# Patient Record
Sex: MALE | Race: WHITE | NOT HISPANIC OR LATINO | Employment: STUDENT | URBAN - NONMETROPOLITAN AREA
[De-identification: names, ages, dates, MRNs, and addresses within clinical notes are randomized per-mention and may not be internally consistent; named-entity substitution may affect disease eponyms.]

---
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
## 2022-12-31 ENCOUNTER — HOSPITAL ENCOUNTER (EMERGENCY)
Facility: HOSPITAL | Age: 22
Discharge: HOME | End: 2022-12-31
Attending: STUDENT IN AN ORGANIZED HEALTH CARE EDUCATION/TRAINING PROGRAM

## 2022-12-31 VITALS
TEMPERATURE: 97.4 F | DIASTOLIC BLOOD PRESSURE: 74 MMHG | OXYGEN SATURATION: 100 % | SYSTOLIC BLOOD PRESSURE: 121 MMHG | RESPIRATION RATE: 15 BRPM | WEIGHT: 140 LBS | HEART RATE: 70 BPM

## 2022-12-31 DIAGNOSIS — H66.001 NON-RECURRENT ACUTE SUPPURATIVE OTITIS MEDIA OF RIGHT EAR WITHOUT SPONTANEOUS RUPTURE OF TYMPANIC MEMBRANE: Primary | ICD-10-CM

## 2022-12-31 RX ORDER — ACETAMINOPHEN 325 MG/1
975 TABLET ORAL ONCE
Status: COMPLETED | OUTPATIENT
Start: 2022-12-31 | End: 2022-12-31

## 2022-12-31 RX ORDER — IBUPROFEN 600 MG/1
600 TABLET ORAL ONCE
Status: COMPLETED | OUTPATIENT
Start: 2022-12-31 | End: 2022-12-31

## 2022-12-31 RX ORDER — AMOXICILLIN AND CLAVULANATE POTASSIUM 875; 125 MG/1; MG/1
1 TABLET, FILM COATED ORAL EVERY 12 HOURS
Qty: 13 TABLET | Refills: 0 | Status: SHIPPED | OUTPATIENT
Start: 2022-12-31 | End: 2023-01-07

## 2022-12-31 RX ORDER — AMOXICILLIN AND CLAVULANATE POTASSIUM 875; 125 MG/1; MG/1
1 TABLET, FILM COATED ORAL ONCE
Status: COMPLETED | OUTPATIENT
Start: 2022-12-31 | End: 2022-12-31

## 2022-12-31 RX ADMIN — ACETAMINOPHEN 975 MG: 325 TABLET ORAL at 04:24

## 2022-12-31 RX ADMIN — IBUPROFEN 600 MG: 600 TABLET ORAL at 04:24

## 2022-12-31 RX ADMIN — AMOXICILLIN AND CLAVULANATE POTASSIUM 1 TABLET: 875; 125 TABLET, FILM COATED ORAL at 04:24

## 2022-12-31 NOTE — DISCHARGE INSTRUCTIONS
You are being treated with antibiotics for treatment of you are being prescribed antibiotics for treatment of an ear infection  Please take as directed  For pain, you can take Motrin 600 mg every 6 hours and Tylenol 1000 g every 6 hours  Do not hesitate to be reevaluated in the ED for any concerning signs or symptoms

## 2022-12-31 NOTE — ED PROVIDER NOTES
History  Chief Complaint   Patient presents with   • Earache     Woke up at 3am with severe R ear pain  Didn't take anything for it       History provided by:  Patient  Earache  Location:  Right  Behind ear:  No abnormality  Quality:  Sharp and aching  Severity:  Moderate  Onset quality:  Gradual  Duration:  1 hour  Timing:  Intermittent  Progression:  Waxing and waning  Chronicity:  New  Context: not direct blow, not recent URI and not water in ear    Relieved by:  None tried  Worsened by:  Nothing  Ineffective treatments:  None tried  Associated symptoms: no abdominal pain, no congestion, no cough, no diarrhea, no ear discharge, no fever, no headaches, no hearing loss, no neck pain, no rash, no rhinorrhea, no sore throat and no vomiting      History reviewed  No pertinent past medical history  History reviewed  No pertinent surgical history  History reviewed  No pertinent family history  I have reviewed and agree with the history as documented  E-Cigarette/Vaping     E-Cigarette/Vaping Substances     Review of Systems   Constitutional: Negative for activity change, appetite change, fatigue and fever  HENT: Positive for ear pain  Negative for congestion, dental problem, ear discharge, facial swelling, hearing loss, rhinorrhea, sinus pressure, sinus pain and sore throat  Eyes: Negative for photophobia, pain, discharge and redness  Respiratory: Negative for cough, chest tightness, shortness of breath and wheezing  Cardiovascular: Negative for chest pain and palpitations  Gastrointestinal: Negative for abdominal pain, diarrhea, nausea and vomiting  Musculoskeletal: Negative for arthralgias, myalgias, neck pain and neck stiffness  Skin: Negative for color change, pallor, rash and wound  Neurological: Negative for dizziness, light-headedness and headaches  Psychiatric/Behavioral: Positive for sleep disturbance  All other systems reviewed and are negative      Physical Exam  Physical Exam  Vitals and nursing note reviewed  Constitutional:       General: He is not in acute distress  Appearance: He is not ill-appearing or toxic-appearing  HENT:      Head: Normocephalic and atraumatic  Right Ear: Ear canal and external ear normal  Tenderness present  Tympanic membrane is erythematous and bulging  Left Ear: Tympanic membrane, ear canal and external ear normal       Nose: Nose normal  No congestion or rhinorrhea  Mouth/Throat:      Mouth: Mucous membranes are moist       Pharynx: Oropharynx is clear  No oropharyngeal exudate or posterior oropharyngeal erythema  Eyes:      General:         Right eye: No discharge  Left eye: No discharge  Extraocular Movements: Extraocular movements intact  Conjunctiva/sclera: Conjunctivae normal       Pupils: Pupils are equal, round, and reactive to light  Cardiovascular:      Rate and Rhythm: Normal rate and regular rhythm  Pulses: Normal pulses  Heart sounds: Normal heart sounds  No murmur heard  Pulmonary:      Effort: Pulmonary effort is normal  No respiratory distress  Breath sounds: Normal breath sounds  No stridor  No wheezing, rhonchi or rales  Chest:      Chest wall: No tenderness  Abdominal:      General: Abdomen is flat  Bowel sounds are normal  There is no distension  Palpations: Abdomen is soft  There is no mass  Tenderness: There is no abdominal tenderness  There is no right CVA tenderness, left CVA tenderness, guarding or rebound  Hernia: No hernia is present  Musculoskeletal:         General: No swelling, tenderness, deformity or signs of injury  Cervical back: Neck supple  No tenderness  Right lower leg: No edema  Left lower leg: No edema  Skin:     General: Skin is warm and dry  Capillary Refill: Capillary refill takes less than 2 seconds  Coloration: Skin is not jaundiced or pale  Findings: No bruising, erythema, lesion or rash  Neurological:      General: No focal deficit present  Mental Status: He is alert and oriented to person, place, and time  Cranial Nerves: No cranial nerve deficit  Sensory: No sensory deficit  Motor: No weakness  Coordination: Coordination normal    Psychiatric:         Mood and Affect: Mood normal          Behavior: Behavior normal          Thought Content: Thought content normal          Judgment: Judgment normal        Vital Signs  ED Triage Vitals [12/31/22 0410]   Temperature Pulse Respirations Blood Pressure SpO2   (!) 97 4 °F (36 3 °C) 70 15 121/74 100 %      Temp Source Heart Rate Source Patient Position - Orthostatic VS BP Location FiO2 (%)   Temporal Monitor -- Right arm --      Pain Score       9         Vitals:    12/31/22 0410   BP: 121/74   Pulse: 70     ED Medications  Medications   ibuprofen (MOTRIN) tablet 600 mg (600 mg Oral Given 12/31/22 0424)   acetaminophen (TYLENOL) tablet 975 mg (975 mg Oral Given 12/31/22 0424)   amoxicillin-clavulanate (AUGMENTIN) 875-125 mg per tablet 1 tablet (1 tablet Oral Given 12/31/22 0424)     Diagnostic Studies  Results Reviewed     None             No orders to display          Procedures  Procedures     ED Course     MDM     History and clinical findings are most consistent with right AOM without signs of TM perforation  Denies recent URI symptoms  A course of Augmentin was prescribed  First dose was administered in the ED  Motrin/Tylenol recommended for pain  Return precautions discussed  Stable for discharge       Disposition  Final diagnoses:   Non-recurrent acute suppurative otitis media of right ear without spontaneous rupture of tympanic membrane     Time reflects when diagnosis was documented in both MDM as applicable and the Disposition within this note     Time User Action Codes Description Comment    12/31/2022  4:22 AM Uli Rosas Add [[id number] 001] Non-recurrent acute suppurative otitis media of right ear without spontaneous rupture of tympanic membrane       ED Disposition     ED Disposition   Discharge    Condition   Stable    Date/Time   Sat Dec 31, 2022  4:22 AM    Comment   Justin Barragan discharge to home/self care  Follow-up Information    None         Patient's Medications    No medications on file       No discharge procedures on file      PDMP Review     None          ED Provider  Electronically Signed by           Russ Banda DO  12/31/22 2081

## 2023-08-29 ENCOUNTER — HOSPITAL ENCOUNTER (EMERGENCY)
Facility: HOSPITAL | Age: 23
Discharge: HOME | End: 2023-08-29
Attending: EMERGENCY MEDICINE | Admitting: EMERGENCY MEDICINE
Payer: OTHER MISCELLANEOUS

## 2023-08-29 ENCOUNTER — APPOINTMENT (EMERGENCY)
Dept: RADIOLOGY | Facility: HOSPITAL | Age: 23
End: 2023-08-29
Payer: OTHER MISCELLANEOUS

## 2023-08-29 VITALS
SYSTOLIC BLOOD PRESSURE: 118 MMHG | OXYGEN SATURATION: 100 % | WEIGHT: 136 LBS | BODY MASS INDEX: 17.45 KG/M2 | HEIGHT: 74 IN | TEMPERATURE: 97.9 F | HEART RATE: 62 BPM | DIASTOLIC BLOOD PRESSURE: 82 MMHG | RESPIRATION RATE: 16 BRPM

## 2023-08-29 DIAGNOSIS — S99.921A INJURY OF RIGHT FOOT, INITIAL ENCOUNTER: Primary | ICD-10-CM

## 2023-08-29 PROCEDURE — 73610 X-RAY EXAM OF ANKLE: CPT

## 2023-08-29 PROCEDURE — 99283 EMERGENCY DEPT VISIT LOW MDM: CPT

## 2023-08-29 PROCEDURE — 73620 X-RAY EXAM OF FOOT: CPT

## 2023-08-29 PROCEDURE — 99284 EMERGENCY DEPT VISIT MOD MDM: CPT | Performed by: EMERGENCY MEDICINE

## 2023-08-29 RX ORDER — IBUPROFEN 600 MG/1
600 TABLET ORAL ONCE
Status: COMPLETED | OUTPATIENT
Start: 2023-08-29 | End: 2023-08-29

## 2023-08-29 RX ADMIN — IBUPROFEN 600 MG: 600 TABLET ORAL at 11:34

## 2023-08-29 NOTE — ED PROVIDER NOTES
History  Chief Complaint   Patient presents with   • Foot Injury     Right foot dropped air compressor on foot while at work     61-year-old male with out pertinent past medical history who presents to the emergency department for a right foot injury after he dropped an air compressor while at work. States he did have shoes on and states that the air compressor weighed about 300 pounds. Reports pain mostly to the dorsum of his right foot. States pain when he ambulates. Denies any bleeding. No ankle injury reported. No other concerns. Did not take an thing for pain prior to arrival.          None       No past medical history on file. No past surgical history on file. No family history on file. I have reviewed and agree with the history as documented. E-Cigarette/Vaping     E-Cigarette/Vaping Substances          Review of Systems   Constitutional: Negative for activity change, appetite change, chills, diaphoresis and fever. HENT: Negative for congestion, rhinorrhea and sore throat. Eyes: Negative for visual disturbance. Respiratory: Negative for chest tightness and shortness of breath. Cardiovascular: Negative for chest pain, palpitations and leg swelling. Gastrointestinal: Negative for abdominal pain, constipation, diarrhea, nausea and vomiting. Genitourinary: Negative for difficulty urinating and hematuria. Musculoskeletal: Positive for arthralgias, gait problem and joint swelling. Negative for back pain and neck pain. Skin: Negative for color change and rash. Neurological: Negative for dizziness, weakness and headaches. Psychiatric/Behavioral: Negative for behavioral problems. Physical Exam  Physical Exam  Vitals and nursing note reviewed. Constitutional:       General: He is not in acute distress. Appearance: He is well-developed. He is not diaphoretic. HENT:      Head: Normocephalic and atraumatic.       Right Ear: External ear normal.      Left Ear: External ear normal.      Nose: Nose normal.   Eyes:      Pupils: Pupils are equal, round, and reactive to light. Cardiovascular:      Rate and Rhythm: Normal rate. Pulses: Normal pulses. Pulmonary:      Effort: Pulmonary effort is normal.   Musculoskeletal:         General: Tenderness present. No deformity. Normal range of motion. Comments: 1.5 cm area of erythema on the dorsum of the right foot; tenderness to palpation at that location; no obvious deformity notable; no tenderness over the ankle and foot otherwise; 2+ DP and PT pulses with the right lower extremity with normal sensation throughout the foot and toes   Skin:     General: Skin is warm and dry. Capillary Refill: Capillary refill takes less than 2 seconds. Findings: No erythema or rash. Neurological:      General: No focal deficit present. Mental Status: He is alert. Motor: No abnormal muscle tone. Psychiatric:         Behavior: Behavior normal.         Vital Signs  ED Triage Vitals   Temperature Pulse Respirations Blood Pressure SpO2   08/29/23 1105 08/29/23 1105 08/29/23 1105 08/29/23 1105 08/29/23 1105   97.9 °F (36.6 °C) 62 16 118/82 100 %      Temp src Heart Rate Source Patient Position - Orthostatic VS BP Location FiO2 (%)   -- 08/29/23 1105 08/29/23 1105 08/29/23 1105 --    Monitor Lying Right arm       Pain Score       08/29/23 1134       6           Vitals:    08/29/23 1105   BP: 118/82   Pulse: 62   Patient Position - Orthostatic VS: Lying         Visual Acuity      ED Medications  Medications   ibuprofen (MOTRIN) tablet 600 mg (600 mg Oral Given 8/29/23 1134)       Diagnostic Studies  Results Reviewed     None                 XR foot 2 views RIGHT   Final Result by Sam Petersen MD (08/29 1145)      Limited evaluation of the foot as noted above. No evidence for acute fracture or dislocation.             Workstation performed: [id number]         XR ankle 3+ views RIGHT   Final Result by Sam Petersen MD (08/29 1145)      Limited evaluation of the foot as noted above. No evidence for acute fracture or dislocation. Workstation performed: [id number]                    Procedures -Ace wrap applied by nursing  Procedures         ED Course          RESULTS:  Results Reviewed     None          XR foot 2 views RIGHT   Final Result      Limited evaluation of the foot as noted above. No evidence for acute fracture or dislocation. Workstation performed: [id number]         XR ankle 3+ views RIGHT   Final Result      Limited evaluation of the foot as noted above. No evidence for acute fracture or dislocation. Workstation performed: [id number]             Vitals:    08/29/23 1105   BP: 118/82   Pulse: 62   Resp: 16   Patient Position - Orthostatic VS: Lying   Temp: 97.9 °F (36.6 °C)           Medical Decision Making  74-year-old male with out pertinent past medical history who presents to the emergency department for a right foot injury after he dropped an air compressor while at work. States he did have shoes on and states that the air compressor weighed about 300 pounds. Vital signs are nonconcerning. X-rays obtained which confirm no fracture. Patient however has significant pain and reported pain with ambulation. Given this is a work incident, patient was placed in Ace wrap and had crutches provided, advised to remain nonweightbearing on the right lower extremity until seen by orthopedics. Orthopedic referral placed. Work note provided. Patient was given Motrin here and advised to continue NSAIDs and Tylenol at home. Amount and/or Complexity of Data Reviewed  Radiology: ordered and independent interpretation performed. Decision-making details documented in ED Course. Risk  Prescription drug management.           Disposition  Final diagnoses:   Injury of right foot, initial encounter     Time reflects when diagnosis was documented in both MDM as applicable and the Disposition within this note     Time User Action Codes Description Comment    8/29/2023 11:13 AM Giacomo MELENDREZ Add [S90.31XA] Contusion of right foot, initial encounter     8/29/2023 11:14 AM Nixon Chaparro Add [S87.256O] Injury of right foot, initial encounter     8/29/2023 11:14 AM Nixon Chaparro Modify [X93.519T] Injury of right foot, initial encounter     8/29/2023 11:14 AM Nixon Chaparro Remove [S90.31XA] Contusion of right foot, initial encounter       ED Disposition     ED Disposition   Discharge    Condition   Stable    Date/Time   Tue Aug 29, 2023 11:13 AM    Comment   Lovely Barragan discharge to home/self care.                Follow-up Information     Follow up With Specialties Details Why [address], DO Pediatrics   [address] [id number] [address] [facility] Orthopedic Surgery Call   [address] [facility]  [address]  [address] [place] [id number] [phone]            Patient's Medications    No medications on file           PDMP Review     None          ED Provider  Electronically Signed by           Nixon Chaparro MD  08/29/23 0032

## 2023-08-29 NOTE — Clinical Note
Manju Alex was seen and treated in our emergency department on 8/29/2023. Diagnosis:     Nathalia Sharma  may return to work on return date. He may return on this date: 08/30/2023    Please remain nonweightbearing on your right lower extremity until seen by orthopedics     If you have any questions or concerns, please don't hesitate to call.       Deb Zuleta MD    ______________________________           _______________          _______________  Hospital Representative                              Date                                Time

## 2023-08-29 NOTE — DISCHARGE INSTRUCTIONS
Thank you for letting us take care of you. You have been evaluated for a foot injury. Please follow-up as instructed. Please return for worsening symptoms. At this time, you have no clinical evidence of symptoms or problems that will require hospitalization, however you should be evaluated soon by a primary care physician, and contact information has been provided. Follow up with your primary care physician. This is important as many medical conditions can be managed as an outpatient, in addition to routine health screening. Seeing your primary doctor often can help identify changes in the medical issue that brought you to the ED for care today. If you experience any new symptoms or acute worsening of current symptoms, please return to the ED.